# Patient Record
Sex: MALE | Race: WHITE | NOT HISPANIC OR LATINO
[De-identification: names, ages, dates, MRNs, and addresses within clinical notes are randomized per-mention and may not be internally consistent; named-entity substitution may affect disease eponyms.]

---

## 2017-12-18 ENCOUNTER — TRANSCRIPTION ENCOUNTER (OUTPATIENT)
Age: 57
End: 2017-12-18

## 2019-01-15 ENCOUNTER — APPOINTMENT (OUTPATIENT)
Dept: INTERNAL MEDICINE | Facility: CLINIC | Age: 59
End: 2019-01-15
Payer: COMMERCIAL

## 2019-01-15 ENCOUNTER — RESULT REVIEW (OUTPATIENT)
Age: 59
End: 2019-01-15

## 2019-01-15 VITALS
SYSTOLIC BLOOD PRESSURE: 118 MMHG | WEIGHT: 140 LBS | HEART RATE: 57 BPM | TEMPERATURE: 98.3 F | BODY MASS INDEX: 22.5 KG/M2 | HEIGHT: 66 IN | OXYGEN SATURATION: 98 % | DIASTOLIC BLOOD PRESSURE: 76 MMHG

## 2019-01-15 DIAGNOSIS — Z87.09 PERSONAL HISTORY OF OTHER DISEASES OF THE RESPIRATORY SYSTEM: ICD-10-CM

## 2019-01-15 DIAGNOSIS — J31.0 CHRONIC RHINITIS: ICD-10-CM

## 2019-01-15 PROCEDURE — 99214 OFFICE O/P EST MOD 30 MIN: CPT

## 2019-01-15 PROCEDURE — G0442 ANNUAL ALCOHOL SCREEN 15 MIN: CPT

## 2019-01-15 PROCEDURE — G0444 DEPRESSION SCREEN ANNUAL: CPT

## 2019-01-15 NOTE — PHYSICAL EXAM

## 2019-01-15 NOTE — HISTORY OF PRESENT ILLNESS
[FreeTextEntry8] : deviated septum.  saw ent last year. \par \par qnasal spray.  \par \par has rhinitis a lot. \par \par had surgery on deviated septum 6 years ago.  \par \par since november,   stuffy nose,  sinus congestion,  sore throat.  mucus,   netipot.   last few days,  right nare had green mucus. \par \par \par Since New Year's, the patient flew on a plane and he felt increasing sinus pressure and pain\par also fatigue,  scratchy throat,  sinus headache,  fatigue.  \par \par also nausea.  \par \par marathon runner.  \par \par qnasl   and netipot.\par \par saw ent last year .  \par \par

## 2019-02-14 ENCOUNTER — RESULT REVIEW (OUTPATIENT)
Age: 59
End: 2019-02-14

## 2019-02-14 ENCOUNTER — RESULT CHARGE (OUTPATIENT)
Age: 59
End: 2019-02-14

## 2019-12-24 ENCOUNTER — MEDICATION RENEWAL (OUTPATIENT)
Age: 59
End: 2019-12-24

## 2019-12-24 DIAGNOSIS — F41.9 ANXIETY DISORDER, UNSPECIFIED: ICD-10-CM

## 2019-12-24 DIAGNOSIS — F32.9 ANXIETY DISORDER, UNSPECIFIED: ICD-10-CM

## 2020-12-21 PROBLEM — Z87.09 HISTORY OF ACUTE SINUSITIS: Status: RESOLVED | Noted: 2019-01-15 | Resolved: 2020-12-21

## 2021-11-04 ENCOUNTER — NON-APPOINTMENT (OUTPATIENT)
Age: 61
End: 2021-11-04

## 2021-11-04 ENCOUNTER — APPOINTMENT (OUTPATIENT)
Dept: GASTROENTEROLOGY | Facility: CLINIC | Age: 61
End: 2021-11-04
Payer: COMMERCIAL

## 2021-11-04 VITALS
TEMPERATURE: 94.1 F | HEART RATE: 69 BPM | OXYGEN SATURATION: 98 % | WEIGHT: 140 LBS | DIASTOLIC BLOOD PRESSURE: 86 MMHG | SYSTOLIC BLOOD PRESSURE: 110 MMHG | HEIGHT: 66 IN | BODY MASS INDEX: 22.5 KG/M2

## 2021-11-04 DIAGNOSIS — R68.81 EARLY SATIETY: ICD-10-CM

## 2021-11-04 DIAGNOSIS — Z12.11 ENCOUNTER FOR SCREENING FOR MALIGNANT NEOPLASM OF COLON: ICD-10-CM

## 2021-11-04 DIAGNOSIS — R10.9 UNSPECIFIED ABDOMINAL PAIN: ICD-10-CM

## 2021-11-04 DIAGNOSIS — R14.0 ABDOMINAL DISTENSION (GASEOUS): ICD-10-CM

## 2021-11-04 PROCEDURE — 99244 OFF/OP CNSLTJ NEW/EST MOD 40: CPT

## 2021-11-04 RX ORDER — FLUTICASONE PROPIONATE 50 UG/1
50 SPRAY, METERED NASAL
Refills: 0 | Status: ACTIVE | COMMUNITY

## 2021-11-04 RX ORDER — AZELASTINE HYDROCHLORIDE 137 UG/1
137 SPRAY, METERED NASAL DAILY
Qty: 1 | Refills: 5 | Status: DISCONTINUED | COMMUNITY
Start: 2019-01-15 | End: 2021-11-04

## 2021-11-04 RX ORDER — NORTRIPTYLINE HYDROCHLORIDE 25 MG/1
25 CAPSULE ORAL
Refills: 0 | Status: ACTIVE | COMMUNITY

## 2021-11-04 RX ORDER — AMOXICILLIN AND CLAVULANATE POTASSIUM 875; 125 MG/1; MG/1
875-125 TABLET, COATED ORAL
Qty: 20 | Refills: 0 | Status: DISCONTINUED | COMMUNITY
Start: 2019-01-15 | End: 2021-11-04

## 2021-11-04 RX ORDER — BUPROPION HYDROCHLORIDE 300 MG/1
300 TABLET, EXTENDED RELEASE ORAL DAILY
Qty: 90 | Refills: 0 | Status: DISCONTINUED | COMMUNITY
Start: 2019-12-24 | End: 2021-11-04

## 2021-11-06 PROBLEM — R68.81 EARLY SATIETY: Status: ACTIVE | Noted: 2021-11-06

## 2021-11-19 ENCOUNTER — RESULT REVIEW (OUTPATIENT)
Age: 61
End: 2021-11-19

## 2021-11-22 NOTE — PHYSICAL EXAM
[General Appearance - Alert] : alert [General Appearance - In No Acute Distress] : in no acute distress [Sclera] : the sclera and conjunctiva were normal [Outer Ear] : the ears and nose were normal in appearance [Hearing Threshold Finger Rub Not [place]] : hearing was normal [Neck Appearance] : the appearance of the neck was normal [] : no respiratory distress [Apical Impulse] : the apical impulse was normal [Abdomen Tenderness] : non-tender [Abdomen Soft] : soft [Abnormal Walk] : normal gait [Skin Color & Pigmentation] : normal skin color and pigmentation [Oriented To Time, Place, And Person] : oriented to person, place, and time [FreeTextEntry1] : deferred

## 2021-11-22 NOTE — ADDENDUM
[FreeTextEntry1] : 11/22/21- left detailed message for patient to let him know that CT scan did not show concerning findings

## 2021-11-22 NOTE — HISTORY OF PRESENT ILLNESS
[FreeTextEntry1] : 60 year old M tension RAMON on nortriptyline, marathon runner, presents for evaluation of abdominal pain/bloating. He is seen at the request of Dr. Ben Espino\par \par started 03/2020\par bloating, hard abdomen, R>L, would occur daily, worse at dinner, resting supine helpful. \par he is a frequent runner. \par pain would after running, off/on\par he typically runs before dinner. not a/w change in bowel pattern /brb/melena\par saw GI ~ 1 year ago\par he had EGD, normal except for HH\par prescribed- pepcid and PPI-not helpful \par tried gluten free diet x 1 month- not helpful.\par tried probiotic,not helpful\par \par \par today, he is experiencing bloating/discomfort, although it has improved significantly in the last 2 months, it is very brief now. not as severe. \par He admits to eating more during covid and being more sedentary\par He lost 4-5 lbs in last few months, trying to eat better. \par He feels like he "fills up easily" in the last 6- 9 months- can occur when bloated or not\par feels like food is sitting at his epigastrium when he swallows\par \par  \par he reports labs with PMD normal 06/2021\par had URI in spring 2021, during evaluation Abd US was ordered, showed liver cysts-[facility]ology\par \par colonoscopy at 50 normal. 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 \par \par stopped NSAIDS- many years ago. \par \par ran 1/2 marathon in May 2021, started running less in July due to calf injury\par still was biking. \par leg pain improved in sept. started running again, now feels like himself again, running 4 days a week, 20 miles per week. \par \par \par fam hx- negative for colon polyps colon cancer\par \par PMD: Dr. Ben Espino

## 2021-11-22 NOTE — ASSESSMENT
[FreeTextEntry1] : Abdominal pain/bloating, recently associated with some mild early satiety,  symptom association with running raises possibility of mild ischemia, although this is less likely in lifelong runner. Some dietary component , as healthy diet change appear to have improved symptoms\par -CT A/P with IVC\par -EGD, Colonoscopy patient due for routine screening\par -advised patient to keep 7 day food/ pain /running diary \par -high fiber diet\par

## 2022-01-18 ENCOUNTER — RESULT REVIEW (OUTPATIENT)
Age: 62
End: 2022-01-18

## 2022-01-19 ENCOUNTER — RESULT REVIEW (OUTPATIENT)
Age: 62
End: 2022-01-19

## 2022-01-20 ENCOUNTER — APPOINTMENT (OUTPATIENT)
Dept: GASTROENTEROLOGY | Facility: HOSPITAL | Age: 62
End: 2022-01-20
Payer: COMMERCIAL

## 2022-01-20 PROCEDURE — 43239 EGD BIOPSY SINGLE/MULTIPLE: CPT

## 2022-01-20 PROCEDURE — 45380 COLONOSCOPY AND BIOPSY: CPT
